# Patient Record
Sex: MALE | Race: WHITE
[De-identification: names, ages, dates, MRNs, and addresses within clinical notes are randomized per-mention and may not be internally consistent; named-entity substitution may affect disease eponyms.]

---
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
## 2022-05-22 ENCOUNTER — HOSPITAL ENCOUNTER (EMERGENCY)
Dept: HOSPITAL 47 - EC | Age: 22
LOS: 1 days | Discharge: HOME | End: 2022-05-23
Payer: COMMERCIAL

## 2022-05-22 DIAGNOSIS — F41.9: ICD-10-CM

## 2022-05-22 DIAGNOSIS — U07.1: Primary | ICD-10-CM

## 2022-05-22 DIAGNOSIS — F90.9: ICD-10-CM

## 2022-05-22 DIAGNOSIS — F17.200: ICD-10-CM

## 2022-05-22 DIAGNOSIS — F12.90: ICD-10-CM

## 2022-05-22 DIAGNOSIS — F32.A: ICD-10-CM

## 2022-05-22 LAB
ALBUMIN SERPL-MCNC: 4.8 G/DL (ref 3.5–5)
ALP SERPL-CCNC: 66 U/L (ref 38–126)
ALT SERPL-CCNC: 60 U/L (ref 4–49)
AMYLASE SERPL-CCNC: 61 U/L (ref 30–110)
ANION GAP SERPL CALC-SCNC: 9 MMOL/L
AST SERPL-CCNC: 42 U/L (ref 17–59)
BASOPHILS # BLD AUTO: 0 K/UL (ref 0–0.2)
BASOPHILS NFR BLD AUTO: 0 %
BUN SERPL-SCNC: 15 MG/DL (ref 9–20)
CALCIUM SPEC-MCNC: 9.2 MG/DL (ref 8.4–10.2)
CHLORIDE SERPL-SCNC: 104 MMOL/L (ref 98–107)
CO2 SERPL-SCNC: 26 MMOL/L (ref 22–30)
EOSINOPHIL # BLD AUTO: 0.1 K/UL (ref 0–0.7)
EOSINOPHIL NFR BLD AUTO: 1 %
ERYTHROCYTE [DISTWIDTH] IN BLOOD BY AUTOMATED COUNT: 5.14 M/UL (ref 4.3–5.9)
ERYTHROCYTE [DISTWIDTH] IN BLOOD: 13.1 % (ref 11.5–15.5)
GLUCOSE SERPL-MCNC: 100 MG/DL (ref 74–99)
HCT VFR BLD AUTO: 48.5 % (ref 39–53)
HGB BLD-MCNC: 16.2 GM/DL (ref 13–17.5)
LIPASE SERPL-CCNC: 37 U/L (ref 23–300)
LYMPHOCYTES # SPEC AUTO: 1 K/UL (ref 1–4.8)
LYMPHOCYTES NFR SPEC AUTO: 8 %
MCH RBC QN AUTO: 31.6 PG (ref 25–35)
MCHC RBC AUTO-ENTMCNC: 33.4 G/DL (ref 31–37)
MCV RBC AUTO: 94.5 FL (ref 80–100)
MONOCYTES # BLD AUTO: 0.5 K/UL (ref 0–1)
MONOCYTES NFR BLD AUTO: 4 %
NEUTROPHILS # BLD AUTO: 11.2 K/UL (ref 1.3–7.7)
NEUTROPHILS NFR BLD AUTO: 86 %
PLATELET # BLD AUTO: 238 K/UL (ref 150–450)
POTASSIUM SERPL-SCNC: 4.7 MMOL/L (ref 3.5–5.1)
PROT SERPL-MCNC: 8.2 G/DL (ref 6.3–8.2)
SODIUM SERPL-SCNC: 139 MMOL/L (ref 137–145)
WBC # BLD AUTO: 13 K/UL (ref 3.8–10.6)

## 2022-05-22 PROCEDURE — 99284 EMERGENCY DEPT VISIT MOD MDM: CPT

## 2022-05-22 PROCEDURE — 74018 RADEX ABDOMEN 1 VIEW: CPT

## 2022-05-22 PROCEDURE — 80053 COMPREHEN METABOLIC PANEL: CPT

## 2022-05-22 PROCEDURE — 83690 ASSAY OF LIPASE: CPT

## 2022-05-22 PROCEDURE — 36415 COLL VENOUS BLD VENIPUNCTURE: CPT

## 2022-05-22 PROCEDURE — 87502 INFLUENZA DNA AMP PROBE: CPT

## 2022-05-22 PROCEDURE — 82150 ASSAY OF AMYLASE: CPT

## 2022-05-22 PROCEDURE — 85025 COMPLETE CBC W/AUTO DIFF WBC: CPT

## 2022-05-22 PROCEDURE — 87635 SARS-COV-2 COVID-19 AMP PRB: CPT

## 2022-05-22 NOTE — XR
EXAMINATION TYPE: XR KUB

 

DATE OF EXAM: 5/22/2022

 

COMPARISON: NONE

 

HISTORY: Vomiting

 

TECHNIQUE: 2 views upright

 

FINDINGS: There is no sign of intestinal obstruction or pneumoperitoneum. Fecal pattern is normal. Th
ere are no calcifications over the kidneys. Lung bases are clear.

 

IMPRESSION: Nonacute abdomen.

## 2022-05-23 VITALS
DIASTOLIC BLOOD PRESSURE: 82 MMHG | SYSTOLIC BLOOD PRESSURE: 122 MMHG | RESPIRATION RATE: 20 BRPM | TEMPERATURE: 98.8 F | HEART RATE: 79 BPM

## 2022-05-23 NOTE — ED
Abdominal Pain HPI





- General


Chief Complaint: Abdominal Pain


Stated Complaint: Vomiting,Nausea


Time Seen by Provider: 05/23/22 00:04


Source: patient


Mode of arrival: ambulatory





- History of Present Illness


Initial Comments: 


Final is a 21-year-old male presents the emergency department today via 

ambulance for evaluation of epigastric discomfort nausea and vomiting.  Patient 

reports he's had nausea and vomiting throughout the day today.  Patient did test

positive or COVID 2 days ago but was not having any symptoms yesterday she did 

not believe it was related.  No suspicious food intake no diarrhea.  Patient 

received pain medication in route to the hospital he then had some vomiting upon

waiting to be evaluated.  He reports feeling much better after Zofran.








- Related Data


                                Home Medications











 Medication  Instructions  Recorded  Confirmed


 


Ibuprofen 800 mg PO DAILY PRN 04/25/18 04/25/18








                                  Previous Rx's











 Medication  Instructions  Recorded


 


HYDROcodone/APAP 5-325MG [Norco 1 tab PO Q6HR PRN #12 tab 04/26/18





5-325]  


 


Ibuprofen [Motrin] 600 mg PO Q8HR PRN #24 tab 04/26/18


 


Ondansetron Odt [Zofran Odt] 4 mg PO Q8HR PRN #10 tab 02/03/22











                                    Allergies











Allergy/AdvReac Type Severity Reaction Status Date / Time


 


No Known Allergies Allergy   Verified 05/22/22 22:41














Review of Systems


ROS Statement: 


Those systems with pertinent positive or pertinent negative responses have been 

documented in the HPI.





ROS Other: All systems not noted in ROS Statement are negative.





Past Medical History


Past Medical History: No Reported History


History of Any Multi-Drug Resistant Organisms: None Reported


Past Surgical History: No Surgical Hx Reported


Past Psychological History: ADD/ADHD, Anxiety, Depression


Smoking Status: Current every day smoker


Past Alcohol Use History: None Reported


Past Drug Use History: Marijuana





General Exam





- General Exam Comments


Initial Comments: 


Physical Exam


GENERAL:


Patient is well-developed and well-nourished.  


Patient is nontoxic and well-hydrated and is in no distress.





HENT:


Normocephalic, Atraumatic. 





EYES:


PERRL, EOMI





PULMONARY:


Unlabored respirations.  





CARDIOVASCULAR:


RRR


Warm and well perfused extremities





ABDOMEN:


Soft, Non-distended


Non peritoneal





SKIN:


No rashes or bruising 





: 


Deferred





NEUROLOGIC:


Alert and oriented


Normal speech


Normal gait





MUSCULOSKELETAL:


Moving all extremities with no apparent injury 





PSYCHIATRIC:


No SI/HI








Course





                                   Vital Signs











  05/22/22





  22:30


 


Temperature 97.9 F


 


Pulse Rate 63


 


Respiratory 19





Rate 


 


Blood Pressure 109/71


 


O2 Sat by Pulse 96





Oximetry 














Medical Decision Making





- Medical Decision Making


Patient labs and imaging were ordered from triage, patient's positive or COVID 

has mild leukocytosis other significant abnormalities


Patient's feeling better after Zofran no further vomiting tolerating oral intake

drinking water will be given a Zofran starter pack and prescribed Zofran for 

home.








- Lab Data


Result diagrams: 


                                 05/22/22 22:12





                                 05/22/22 22:12





                                   Lab Results











  05/22/22 05/22/22 05/22/22 Range/Units





  22:12 22:12 22:46 


 


WBC  13.0 H    (3.8-10.6)  k/uL


 


RBC  5.14    (4.30-5.90)  m/uL


 


Hgb  16.2    (13.0-17.5)  gm/dL


 


Hct  48.5    (39.0-53.0)  %


 


MCV  94.5    (80.0-100.0)  fL


 


MCH  31.6    (25.0-35.0)  pg


 


MCHC  33.4    (31.0-37.0)  g/dL


 


RDW  13.1    (11.5-15.5)  %


 


Plt Count  238    (150-450)  k/uL


 


MPV  8.8    


 


Neutrophils %  86    %


 


Lymphocytes %  8    %


 


Monocytes %  4    %


 


Eosinophils %  1    %


 


Basophils %  0    %


 


Neutrophils #  11.2 H    (1.3-7.7)  k/uL


 


Lymphocytes #  1.0    (1.0-4.8)  k/uL


 


Monocytes #  0.5    (0-1.0)  k/uL


 


Eosinophils #  0.1    (0-0.7)  k/uL


 


Basophils #  0.0    (0-0.2)  k/uL


 


Sodium   139   (137-145)  mmol/L


 


Potassium   4.7   (3.5-5.1)  mmol/L


 


Chloride   104   ()  mmol/L


 


Carbon Dioxide   26   (22-30)  mmol/L


 


Anion Gap   9   mmol/L


 


BUN   15   (9-20)  mg/dL


 


Creatinine   0.95   (0.66-1.25)  mg/dL


 


Est GFR (CKD-EPI)AfAm   >90   (>60 ml/min/1.73 sqM)  


 


Est GFR (CKD-EPI)NonAf   >90   (>60 ml/min/1.73 sqM)  


 


Glucose   100 H   (74-99)  mg/dL


 


Calcium   9.2   (8.4-10.2)  mg/dL


 


Total Bilirubin   1.3   (0.2-1.3)  mg/dL


 


AST   42   (17-59)  U/L


 


ALT   60 H   (4-49)  U/L


 


Alkaline Phosphatase   66   ()  U/L


 


Total Protein   8.2   (6.3-8.2)  g/dL


 


Albumin   4.8   (3.5-5.0)  g/dL


 


Amylase   61   ()  U/L


 


Lipase   37   ()  U/L


 


Coronavirus (PCR)     (Not Detectd)  


 


Influenza Type A RNA    Not Detected  (Not Detectd)  


 


Influenza Type B (PCR)    Not Detected  (Not Detectd)  














  05/22/22 Range/Units





  22:46 


 


WBC   (3.8-10.6)  k/uL


 


RBC   (4.30-5.90)  m/uL


 


Hgb   (13.0-17.5)  gm/dL


 


Hct   (39.0-53.0)  %


 


MCV   (80.0-100.0)  fL


 


MCH   (25.0-35.0)  pg


 


MCHC   (31.0-37.0)  g/dL


 


RDW   (11.5-15.5)  %


 


Plt Count   (150-450)  k/uL


 


MPV   


 


Neutrophils %   %


 


Lymphocytes %   %


 


Monocytes %   %


 


Eosinophils %   %


 


Basophils %   %


 


Neutrophils #   (1.3-7.7)  k/uL


 


Lymphocytes #   (1.0-4.8)  k/uL


 


Monocytes #   (0-1.0)  k/uL


 


Eosinophils #   (0-0.7)  k/uL


 


Basophils #   (0-0.2)  k/uL


 


Sodium   (137-145)  mmol/L


 


Potassium   (3.5-5.1)  mmol/L


 


Chloride   ()  mmol/L


 


Carbon Dioxide   (22-30)  mmol/L


 


Anion Gap   mmol/L


 


BUN   (9-20)  mg/dL


 


Creatinine   (0.66-1.25)  mg/dL


 


Est GFR (CKD-EPI)AfAm   (>60 ml/min/1.73 sqM)  


 


Est GFR (CKD-EPI)NonAf   (>60 ml/min/1.73 sqM)  


 


Glucose   (74-99)  mg/dL


 


Calcium   (8.4-10.2)  mg/dL


 


Total Bilirubin   (0.2-1.3)  mg/dL


 


AST   (17-59)  U/L


 


ALT   (4-49)  U/L


 


Alkaline Phosphatase   ()  U/L


 


Total Protein   (6.3-8.2)  g/dL


 


Albumin   (3.5-5.0)  g/dL


 


Amylase   ()  U/L


 


Lipase   ()  U/L


 


Coronavirus (PCR)  Detected A  (Not Detectd)  


 


Influenza Type A RNA   (Not Detectd)  


 


Influenza Type B (PCR)   (Not Detectd)  














Disposition


Clinical Impression: 


 COVID-19, Nausea and vomiting





Disposition: HOME SELF-CARE


Condition: Stable


Is patient prescribed a controlled substance at d/c from ED?: No


Referrals: 


None,Stated [Primary Care Provider] - 1-2 days

## 2022-08-05 ENCOUNTER — HOSPITAL ENCOUNTER (EMERGENCY)
Dept: HOSPITAL 47 - EC | Age: 22
Discharge: HOME | End: 2022-08-05
Payer: COMMERCIAL

## 2022-08-05 VITALS
TEMPERATURE: 98.1 F | HEART RATE: 95 BPM | DIASTOLIC BLOOD PRESSURE: 59 MMHG | SYSTOLIC BLOOD PRESSURE: 118 MMHG | RESPIRATION RATE: 16 BRPM

## 2022-08-05 DIAGNOSIS — F10.129: Primary | ICD-10-CM

## 2022-08-05 DIAGNOSIS — F39: ICD-10-CM

## 2022-08-05 DIAGNOSIS — F17.200: ICD-10-CM

## 2022-08-05 NOTE — ED
Alcohol HPI





- General


Chief Complaint: Alcohol


Stated Complaint: ETOH, Mental Health


Time Seen by Provider: 08/05/22 21:06


Source: patient, EMS


Mode of arrival: EMS


Limitations: no limitations





- History of Present Illness


Initial Comments: 





This patient is 21-year-old man who is complaining of depression and drinking.  

He does acknowledge that he some suicidal statements to his girlfriend earlier 

today.  He has had previous psychiatric care and states that tried him with 

antidepressants but they seem to make him feel worse.  He is not currently takin

g any medications.  Patient denies any injury or trauma related to drinking 

tonight.


MD Complaint: alcohol intoxication


Time Since Last Drink: 1


-: hour(s)


Associated Symptoms: depression, suicidality


Treatments Prior to Arrival: none


Chronic Alcohol Use: Yes





- Related Data


                                Home Medications











 Medication  Instructions  Recorded  Confirmed


 


Ibuprofen 800 mg PO DAILY PRN 04/25/18 04/25/18








                                  Previous Rx's











 Medication  Instructions  Recorded


 


HYDROcodone/APAP 5-325MG [Norco 1 tab PO Q6HR PRN #12 tab 04/26/18





5-325]  


 


Ibuprofen [Motrin] 600 mg PO Q8HR PRN #24 tab 04/26/18


 


Ondansetron Odt [Zofran Odt] 4 mg PO Q8HR PRN #10 tab 02/03/22


 


Ondansetron [Zofran ODT] 4 mg PO Q8HR #12 tab 05/23/22











                                    Allergies











Allergy/AdvReac Type Severity Reaction Status Date / Time


 


No Known Allergies Allergy   Verified 05/22/22 22:41














Review of Systems


ROS Statement: 


Those systems with pertinent positive or pertinent negative responses have been 

documented in the HPI.





ROS Other: All systems not noted in ROS Statement are negative.


Constitutional: Denies: fever, chills


Respiratory: Denies: cough, dyspnea


Cardiovascular: Denies: chest pain, palpitations


Gastrointestinal: Denies: abdominal pain, vomiting, diarrhea


Genitourinary: Denies: dysuria


Neurological: Denies: headache, weakness


Psychiatric: Reports: depression, suicidal thoughts.  Denies: auditory 

hallucinations, visual hallucinations, homicidal thoughts





Past Medical History


Past Medical History: No Reported History


History of Any Multi-Drug Resistant Organisms: None Reported


Past Surgical History: No Surgical Hx Reported


Past Psychological History: ADD/ADHD, Anxiety, Depression


Smoking Status: Current every day smoker


Past Alcohol Use History: None Reported


Past Drug Use History: Marijuana





General Exam


Limitations: no limitations


General appearance: alert, in no apparent distress


Head exam: Present: atraumatic, normocephalic


Eye exam: Present: normal appearance.  Absent: scleral icterus, conjunctival 

injection


Neck exam: Present: normal inspection, full ROM


Respiratory exam: Present: normal lung sounds bilaterally.  Absent: respiratory 

distress, wheezes, rales, rhonchi, stridor


Cardiovascular Exam: Present: regular rate, normal rhythm, normal heart sounds. 

Absent: systolic murmur, diastolic murmur, rubs, gallop


GI/Abdominal exam: Present: soft.  Absent: distended, tenderness, guarding, 

rebound, rigid, mass


Extremities exam: Present: normal inspection, normal capillary refill.  Absent: 

pedal edema, calf tenderness


Back exam: Present: normal inspection


Neurological exam: Present: alert, oriented X3


Psychiatric exam: Present: depressed, suicidal ideation.  Absent: agitated, 

anxious, flat affect, manic, homicidal ideation


Skin exam: Present: warm, dry, intact, normal color.  Absent: rash





Course


                                   Vital Signs











  08/05/22





  20:49


 


Temperature 98.1 F


 


Pulse Rate 95


 


Respiratory 16





Rate 


 


Blood Pressure 118/59


 


O2 Sat by Pulse 97





Oximetry 














Medical Decision Making





- Medical Decision Making





The patient is 21-year-old man here after he admits some suicidal statements 

earlier at home.  The patient requested to go home.  I did interview him and he 

states that he did make statements that were indicating suicidal ideation but 

that he had been drinking.  He states that he has a lot to live for and inclu

ding a young child at home.  He states that he will return should should he 

start feeling worse.  Patient advised not to drink any alcohol.





Disposition


Clinical Impression: 


 Alcoholic intoxication, Mood disorder





Disposition: HOME SELF-CARE


Condition: Good


Instructions (If sedation given, give patient instructions):  Alcohol 

Intoxication (ED)


Is patient prescribed a controlled substance at d/c from ED?: No


Referrals: 


None,Stated [Primary Care Provider] - 1-2 days

## 2022-09-09 ENCOUNTER — HOSPITAL ENCOUNTER (INPATIENT)
Dept: HOSPITAL 47 - EC | Age: 22
LOS: 4 days | Discharge: HOME | DRG: 881 | End: 2022-09-13
Attending: PSYCHIATRY & NEUROLOGY | Admitting: PSYCHIATRY & NEUROLOGY
Payer: COMMERCIAL

## 2022-09-09 DIAGNOSIS — Z20.822: ICD-10-CM

## 2022-09-09 DIAGNOSIS — F10.129: ICD-10-CM

## 2022-09-09 DIAGNOSIS — S10.91XA: ICD-10-CM

## 2022-09-09 DIAGNOSIS — F12.90: ICD-10-CM

## 2022-09-09 DIAGNOSIS — F41.9: ICD-10-CM

## 2022-09-09 DIAGNOSIS — F32.A: Primary | ICD-10-CM

## 2022-09-09 DIAGNOSIS — F17.200: ICD-10-CM

## 2022-09-09 DIAGNOSIS — T71.162A: ICD-10-CM

## 2022-09-09 LAB
ALBUMIN SERPL-MCNC: 4.8 G/DL (ref 3.5–5)
ALP SERPL-CCNC: 70 U/L (ref 38–126)
ALT SERPL-CCNC: 51 U/L (ref 4–49)
ANION GAP SERPL CALC-SCNC: 15 MMOL/L
APTT BLD: 26.5 SEC (ref 22–30)
AST SERPL-CCNC: 37 U/L (ref 17–59)
BASOPHILS # BLD AUTO: 0.1 K/UL (ref 0–0.2)
BASOPHILS NFR BLD AUTO: 1 %
BUN SERPL-SCNC: 11 MG/DL (ref 9–20)
CALCIUM SPEC-MCNC: 9.9 MG/DL (ref 8.4–10.2)
CHLORIDE SERPL-SCNC: 96 MMOL/L (ref 98–107)
CO2 SERPL-SCNC: 31 MMOL/L (ref 22–30)
EOSINOPHIL # BLD AUTO: 0.1 K/UL (ref 0–0.7)
EOSINOPHIL NFR BLD AUTO: 1 %
ERYTHROCYTE [DISTWIDTH] IN BLOOD BY AUTOMATED COUNT: 5.17 M/UL (ref 4.3–5.9)
ERYTHROCYTE [DISTWIDTH] IN BLOOD: 13.4 % (ref 11.5–15.5)
GLUCOSE BLD-MCNC: 88 MG/DL (ref 70–110)
GLUCOSE SERPL-MCNC: 87 MG/DL (ref 74–99)
HCT VFR BLD AUTO: 49.9 % (ref 39–53)
HGB BLD-MCNC: 16.1 GM/DL (ref 13–17.5)
INR PPP: 1 (ref ?–1.2)
LYMPHOCYTES # SPEC AUTO: 1.8 K/UL (ref 1–4.8)
LYMPHOCYTES NFR SPEC AUTO: 21 %
MCH RBC QN AUTO: 31.2 PG (ref 25–35)
MCHC RBC AUTO-ENTMCNC: 32.4 G/DL (ref 31–37)
MCV RBC AUTO: 96.4 FL (ref 80–100)
MONOCYTES # BLD AUTO: 0.5 K/UL (ref 0–1)
MONOCYTES NFR BLD AUTO: 6 %
NEUTROPHILS # BLD AUTO: 6 K/UL (ref 1.3–7.7)
NEUTROPHILS NFR BLD AUTO: 71 %
PLATELET # BLD AUTO: 187 K/UL (ref 150–450)
POTASSIUM SERPL-SCNC: 4.7 MMOL/L (ref 3.5–5.1)
PROT SERPL-MCNC: 7.9 G/DL (ref 6.3–8.2)
PT BLD: 10.9 SEC (ref 9–12)
SODIUM SERPL-SCNC: 142 MMOL/L (ref 137–145)
WBC # BLD AUTO: 8.5 K/UL (ref 3.8–10.6)

## 2022-09-09 PROCEDURE — 72125 CT NECK SPINE W/O DYE: CPT

## 2022-09-09 PROCEDURE — 99285 EMERGENCY DEPT VISIT HI MDM: CPT

## 2022-09-09 PROCEDURE — 85730 THROMBOPLASTIN TIME PARTIAL: CPT

## 2022-09-09 PROCEDURE — 83605 ASSAY OF LACTIC ACID: CPT

## 2022-09-09 PROCEDURE — 80053 COMPREHEN METABOLIC PANEL: CPT

## 2022-09-09 PROCEDURE — 99291 CRITICAL CARE FIRST HOUR: CPT

## 2022-09-09 PROCEDURE — 80306 DRUG TEST PRSMV INSTRMNT: CPT

## 2022-09-09 PROCEDURE — 80320 DRUG SCREEN QUANTALCOHOLS: CPT

## 2022-09-09 PROCEDURE — 85025 COMPLETE CBC W/AUTO DIFF WBC: CPT

## 2022-09-09 PROCEDURE — 70450 CT HEAD/BRAIN W/O DYE: CPT

## 2022-09-09 PROCEDURE — 36415 COLL VENOUS BLD VENIPUNCTURE: CPT

## 2022-09-09 PROCEDURE — 84443 ASSAY THYROID STIM HORMONE: CPT

## 2022-09-09 PROCEDURE — 87635 SARS-COV-2 COVID-19 AMP PRB: CPT

## 2022-09-09 PROCEDURE — 85610 PROTHROMBIN TIME: CPT

## 2022-09-09 PROCEDURE — 84484 ASSAY OF TROPONIN QUANT: CPT

## 2022-09-09 PROCEDURE — 71045 X-RAY EXAM CHEST 1 VIEW: CPT

## 2022-09-09 NOTE — CT
EXAMINATION TYPE: CT brain cspine wo con

CT DLP: 1763.9 mGycm, Automated exposure control for dose reduction was used.

 

DATE OF EXAM: 9/9/2022 6:39 PM

 

COMPARISON: THIS EXAM WAS READ DURING PACS DOWNTIME, NO PRIORS AVAILABLE.. 

 

CLINICAL INDICATION:Male, 21 years old with history of trauma; trauma, attempted suicide

 

TECHNIQUE: 

Brain: Multiple axial CT images of the brain were obtained without IV contrast. 

Cspine: Axial CT images from the skull base to the inferior aspect of T2 we obtained without intraven
ous contrast. Coronal and sagittal reformatted images were also reviewed. 

 

FINDINGS:

 

Brain:

Extra-axial spaces: No abnormal extra-axial fluid collections.

Ventricular system: Within normal limits

Cerebral parenchyma: No acute intraparenchymal hemorrhage or mass effect.  The gray-white junction is
 well differentiated. 

Cerebellum: Unremarkable.

Mass effect: No evidence of midline shift.

Intracranial vasculature: unremarkable

Soft tissues: Normal.

Calvarium/osseous structures: No depressed skull fracture.

Paranasal sinuses and mastoid air cells: Clear.

Visualized orbits: Orbital contents are intact.

 

Cervical spine:

Fracture: None.

Osseous structures: Unremarkable 

Vertebral alignment: Within normal limits.

Spinal canal/Neural Foramina: No evidence of significant spinal canal narrowing. No evidence for sign
ificant neural foraminal stenosis.

Neck soft tissues: Prevertebral soft tissues are within normal limits.

Other: The airway is patent. The lung apices are clear.

 

IMPRESSION:

1.  No acute intracranial process.

2.  No evidence of cervical spine fracture.

## 2022-09-09 NOTE — ED
Trauma HPI





- General


Stated Complaint: Trauma


Time Seen by Provider: 09/09/22 18:08


Source: patient, RN notes reviewed





- History of Present Illness


Initial Comments: 





21-year-old male history of depression who apparently try to hang himself with a

rope dog leash prior to arrival he states he was about half a foot off the 

ground rope broke however before he passed out he did hit his head when he fell 

he denies a loss of function is upper or lower extremities he does admit to 

drinking alcohol today.  He was found by paramedics to have some abrasion to the

anterior neck consistent with the reports of trying to hang himself.  Patient 

denies any shortness of breath difficulty with swallowing or speech at this 

time.  No deficits to his upper or lower extremities.  He did come in with a 

cervical collar on.


MD Complaint: injury





- Related Data


                                Home Medications











 Medication  Instructions  Recorded  Confirmed


 


No Known Home Medications  09/09/22 09/09/22











                                    Allergies











Allergy/AdvReac Type Severity Reaction Status Date / Time


 


No Known Allergies Allergy   Verified 09/09/22 19:52














Review of Systems


ROS Statement: 


Those systems with pertinent positive or pertinent negative responses have been 

documented in the HPI.





ROS Other: All systems not noted in ROS Statement are negative.





Past Medical History


Past Medical History: No Reported History


History of Any Multi-Drug Resistant Organisms: None Reported


Past Surgical History: No Surgical Hx Reported


Past Psychological History: ADD/ADHD, Anxiety, Depression


Smoking Status: Current every day smoker


Past Alcohol Use History: None Reported


Past Drug Use History: Marijuana





General Exam





- General Exam Comments


Initial Comments: 





This is a well-developed well-nourished awake alert oriented 4 male


General appearance: alert, anxious


Head exam: Present: atraumatic, normocephalic, normal inspection


Eye exam: Present: normal appearance, PERRL, EOMI.  Absent: scleral icterus, 

conjunctival injection, periorbital swelling


ENT exam: Present: normal exam, normal oropharynx, mucous membranes moist


Neck exam: Present: normal inspection, other (Cervical collar in place 

superficial abrasion seen over the anterior upper neck no localized edema no 

stridor JVD or bruits).  Absent: tenderness, meningismus, lymphadenopathy


Respiratory exam: Present: normal lung sounds bilaterally.  Absent: respiratory 

distress, wheezes, rales, rhonchi, stridor


Cardiovascular Exam: Present: regular rate, normal rhythm, normal heart sounds. 

Absent: systolic murmur, diastolic murmur, rubs, gallop, clicks


GI/Abdominal exam: Present: soft, normal bowel sounds.  Absent: distended, 

tenderness, guarding, rebound, rigid


Extremities exam: Present: normal inspection, full ROM, normal capillary refill.

 Absent: tenderness, pedal edema, joint swelling, calf tenderness


Back exam: Present: normal inspection


Neurological exam: Present: alert, oriented X3, CN II-XII intact


Psychiatric exam: Present: normal affect, normal mood


Skin exam: Present: warm, dry, normal color, other (As noted above).  Absent: 

rash





Course


                                   Vital Signs











  09/09/22





  18:41


 


Temperature 98 F


 


Pulse Rate 76


 


Respiratory 16





Rate 


 


Blood Pressure 122/63


 


O2 Sat by Pulse 97





Oximetry 














Medical Decision Making





- Medical Decision Making





Patient was activated priority 2 trauma did discuss case with Dr. Cannon.  

Patient is now medically cleared for psychiatric evaluation patient was eval

uated by the EPS service she will be admitted inpatient for further evaluation 

treatment for suicidal thoughts and depression.





- Lab Data


Result diagrams: 


                                 09/09/22 18:31





                                 09/09/22 18:31


                                   Lab Results











  09/09/22 09/09/22 09/09/22 Range/Units





  18:20 18:31 18:31 


 


WBC   8.5   (3.8-10.6)  k/uL


 


RBC   5.17   (4.30-5.90)  m/uL


 


Hgb   16.1   (13.0-17.5)  gm/dL


 


Hct   49.9   (39.0-53.0)  %


 


MCV   96.4   (80.0-100.0)  fL


 


MCH   31.2   (25.0-35.0)  pg


 


MCHC   32.4   (31.0-37.0)  g/dL


 


RDW   13.4   (11.5-15.5)  %


 


Plt Count   187   (150-450)  k/uL


 


MPV   8.7   


 


Neutrophils %   71   %


 


Lymphocytes %   21   %


 


Monocytes %   6   %


 


Eosinophils %   1   %


 


Basophils %   1   %


 


Neutrophils #   6.0   (1.3-7.7)  k/uL


 


Lymphocytes #   1.8   (1.0-4.8)  k/uL


 


Monocytes #   0.5   (0-1.0)  k/uL


 


Eosinophils #   0.1   (0-0.7)  k/uL


 


Basophils #   0.1   (0-0.2)  k/uL


 


PT    10.9  (9.0-12.0)  sec


 


INR    1.0  (<1.2)  


 


APTT    26.5  (22.0-30.0)  sec


 


Sodium     (137-145)  mmol/L


 


Potassium     (3.5-5.1)  mmol/L


 


Chloride     ()  mmol/L


 


Carbon Dioxide     (22-30)  mmol/L


 


Anion Gap     mmol/L


 


BUN     (9-20)  mg/dL


 


Creatinine     (0.66-1.25)  mg/dL


 


Est GFR (CKD-EPI)AfAm     (>60 ml/min/1.73 sqM)  


 


Est GFR (CKD-EPI)NonAf     (>60 ml/min/1.73 sqM)  


 


Glucose     (74-99)  mg/dL


 


POC Glucose (mg/dL)  88    ()  mg/dL


 


POC Glu Operater ID  Jian Zavala    


 


Plasma Lactic Acid Woo     (0.7-2.0)  mmol/L


 


Calcium     (8.4-10.2)  mg/dL


 


Total Bilirubin     (0.2-1.3)  mg/dL


 


AST     (17-59)  U/L


 


ALT     (4-49)  U/L


 


Alkaline Phosphatase     ()  U/L


 


Troponin I     (0.000-0.034)  ng/mL


 


Total Protein     (6.3-8.2)  g/dL


 


Albumin     (3.5-5.0)  g/dL


 


Serum Alcohol     mg/dL














  09/09/22 09/09/22 09/09/22 Range/Units





  18:31 18:31 18:31 


 


WBC     (3.8-10.6)  k/uL


 


RBC     (4.30-5.90)  m/uL


 


Hgb     (13.0-17.5)  gm/dL


 


Hct     (39.0-53.0)  %


 


MCV     (80.0-100.0)  fL


 


MCH     (25.0-35.0)  pg


 


MCHC     (31.0-37.0)  g/dL


 


RDW     (11.5-15.5)  %


 


Plt Count     (150-450)  k/uL


 


MPV     


 


Neutrophils %     %


 


Lymphocytes %     %


 


Monocytes %     %


 


Eosinophils %     %


 


Basophils %     %


 


Neutrophils #     (1.3-7.7)  k/uL


 


Lymphocytes #     (1.0-4.8)  k/uL


 


Monocytes #     (0-1.0)  k/uL


 


Eosinophils #     (0-0.7)  k/uL


 


Basophils #     (0-0.2)  k/uL


 


PT     (9.0-12.0)  sec


 


INR     (<1.2)  


 


APTT     (22.0-30.0)  sec


 


Sodium  142    (137-145)  mmol/L


 


Potassium  4.7    (3.5-5.1)  mmol/L


 


Chloride  96 L    ()  mmol/L


 


Carbon Dioxide  31 H    (22-30)  mmol/L


 


Anion Gap  15    mmol/L


 


BUN  11    (9-20)  mg/dL


 


Creatinine  0.87    (0.66-1.25)  mg/dL


 


Est GFR (CKD-EPI)AfAm  >90    (>60 ml/min/1.73 sqM)  


 


Est GFR (CKD-EPI)NonAf  >90    (>60 ml/min/1.73 sqM)  


 


Glucose  87    (74-99)  mg/dL


 


POC Glucose (mg/dL)     ()  mg/dL


 


POC Glu Operater ID     


 


Plasma Lactic Acid Woo   3.0 H*   (0.7-2.0)  mmol/L


 


Calcium  9.9    (8.4-10.2)  mg/dL


 


Total Bilirubin  0.9    (0.2-1.3)  mg/dL


 


AST  37    (17-59)  U/L


 


ALT  51 H    (4-49)  U/L


 


Alkaline Phosphatase  70    ()  U/L


 


Troponin I    <0.012  (0.000-0.034)  ng/mL


 


Total Protein  7.9    (6.3-8.2)  g/dL


 


Albumin  4.8    (3.5-5.0)  g/dL


 


Serum Alcohol  <10    mg/dL














- EKG Data


-: EKG Interpreted by Me


EKG shows normal: sinus rhythm


EKG Comments: 





Sinus rhythm a 77.  Interval 150 QRS duration 100 QT since /400 

nonspecific T-wave configuration





- Radiology Data


Radiology results: report reviewed (Image reviewed as well as report no acute 

findings.), image reviewed





Critical Care Time


Critical Care Time: Yes


Total Critical Care Time: 39


Critical Care Time: 





Critical care time including initial presentation with history physical 

discussed with paramedics labs x-rays reevaluation patient on several occasions 

discussion with family.  Discussed with the EPS service documentation the above 

review of old charting was available.





Disposition


Clinical Impression: 


 Depression, Attempted suicide, Hanging, Neck abrasion





Disposition: TRANSFER TO PSYCH HOSP/UNIT


Condition: Stable


Referrals: 


None,Stated [Primary Care Provider] - 1-2 days


Decision Date: 09/09/22


Decision Time: 20:55

## 2022-09-09 NOTE — XR
EXAMINATION TYPE: XR chest 1V portable

 

DATE OF EXAM: 9/9/2022 6:35 PM

 

COMPARISON: THIS EXAM WAS READ DURING PACS DOWNTIME, NO PRIORS AVAILABLE.

 

TECHNIQUE: XR chest 1V portable Portable AP radiograph of the chest.

 

CLINICAL INDICATION:Male, 21 years old with history of trauma; 

 

FINDINGS: 

Lungs/Pleura: There is no evidence of pleural effusion, focal consolidation, or pneumothorax.  

Pulmonary vascularity: Unremarkable.

Heart/mediastinum: Cardiomediastinal silhouette is unremarkable.

Musculoskeletal: No acute osseous pathology.

 

 

IMPRESSION: 

No acute cardiopulmonary disease/process.

## 2022-09-10 RX ADMIN — NICOTINE POLACRILEX PRN MG: 2 GUM, CHEWING BUCCAL at 20:41

## 2022-09-10 RX ADMIN — NICOTINE POLACRILEX PRN MG: 2 GUM, CHEWING BUCCAL at 18:23

## 2022-09-10 NOTE — P.CONS
History of Present Illness





- Reason for Consult


Consult date: 09/10/22





- History of Present Illness





The patient is a 21-year-old male with no known PMH who was brought into the 

emergency room by EMS after he was found attempting to hang himself.  The 

patient was admitted to the mental health unit where he was seen and evaluated. 

The patient was reluctant to talk about the episode but stated that he had been 

drinking, which led to his erratic behavior.  He denied any suicidal ideation.  

He denied any chronic medical illnesses.  Reports only occasional alcohol use 

which she is trying to quit.  Denied history of alcohol withdrawal.  Denied 

experiencing chest discomfort, shortness of breath, fever, chills, cough, 

nausea, vomiting, abdominal pain, diarrhea.  The patient's laboratory outpatient

in the emergency room was remarkable for lactic acidosis of 3.0 with urine tox 

positive for marijuana.





Review of systems:


Pertinent positives and negatives as discussed in HPI, a complete review of 

systems was performed and all other systems are negative.





Physical examination:


General: non toxic, no distress, appears at stated age, normal weight


Derm: no unusual rashes/lesions, no unusual ecchymoses, warm, dry


Head: atraumatic, normocephalic, symmetric


Eyes: EOMI, no lid lag, anicteric sclera


ENT: Nose and ears atraumatic, no thrush,  no pharyngeal erythema


Neck: trachea midline, supple


Mouth: no lip lesion, mucus membranes moist


Cardiovascular: S1S2 reg, no murmur, no edema


Lungs: CTA bilateral, no rhonchi, no rales , no accessory muscle use


Abdominal: soft,  nontender to palpation, no guarding


Ext: no gross muscle atrophy, no contractures, 


Neuro: No gross focal neuro deficits noted 


Psych: Alert, oriented, appropriate affect 





Assessment/plan





Marijuana and alcohol abuse


-Advised on importance of cessation





Depression with suicidal ideation


-As per psychiatry





Thank you for allowing us to participate in the care of this patient.  We will 

follow peripherally.  Do not hesitate to contact us with questions.  Someone can

be reached from the [facility] hospitalist group at all hours of the day 

at [phone].





Past Medical History


Past Medical History: No Reported History


History of Any Multi-Drug Resistant Organisms: None Reported


Past Surgical History: No Surgical Hx Reported


Past Psychological History: ADD/ADHD, Anxiety, Depression


Smoking Status: Current every day smoker


Past Alcohol Use History: None Reported


Past Drug Use History: Marijuana





- Past Family History


  ** Mother


Family Medical History: Hypertension





Medications and Allergies


                                Home Medications











 Medication  Instructions  Recorded  Confirmed  Type


 


No Known Home Medications  09/09/22 09/09/22 History








                                    Allergies











Allergy/AdvReac Type Severity Reaction Status Date / Time


 


No Known Allergies Allergy   Verified 09/09/22 19:52














Physical Exam


Vitals: 


                                   Vital Signs











  Temp Pulse Resp BP


 


 09/10/22 00:33  98.0 F  93  16  136/73














Results


CBC & Chem 7: 


                                 09/09/22 18:31





                                 09/09/22 18:31


Labs: 


                  Abnormal Lab Results - Last 24 Hours (Table)











  09/09/22 Range/Units





  21:32 


 


U Marijuana (THC) Screen  Detected H  (NotDetected)
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
## 2022-09-11 LAB
ALBUMIN SERPL-MCNC: 5 G/DL (ref 3.5–5)
ALP SERPL-CCNC: 88 U/L (ref 38–126)
ALT SERPL-CCNC: 62 U/L (ref 4–49)
ANION GAP SERPL CALC-SCNC: 14 MMOL/L
AST SERPL-CCNC: 40 U/L (ref 17–59)
BASOPHILS # BLD AUTO: 0 K/UL (ref 0–0.2)
BASOPHILS NFR BLD AUTO: 0 %
BUN SERPL-SCNC: 14 MG/DL (ref 9–20)
CALCIUM SPEC-MCNC: 10.2 MG/DL (ref 8.4–10.2)
CHLORIDE SERPL-SCNC: 97 MMOL/L (ref 98–107)
CO2 SERPL-SCNC: 28 MMOL/L (ref 22–30)
EOSINOPHIL # BLD AUTO: 0 K/UL (ref 0–0.7)
EOSINOPHIL NFR BLD AUTO: 0 %
ERYTHROCYTE [DISTWIDTH] IN BLOOD BY AUTOMATED COUNT: 5.26 M/UL (ref 4.3–5.9)
ERYTHROCYTE [DISTWIDTH] IN BLOOD: 13.6 % (ref 11.5–15.5)
GLUCOSE SERPL-MCNC: 101 MG/DL (ref 74–99)
HCT VFR BLD AUTO: 51.7 % (ref 39–53)
HGB BLD-MCNC: 17.2 GM/DL (ref 13–17.5)
LYMPHOCYTES # SPEC AUTO: 1.4 K/UL (ref 1–4.8)
LYMPHOCYTES NFR SPEC AUTO: 19 %
MCH RBC QN AUTO: 32.7 PG (ref 25–35)
MCHC RBC AUTO-ENTMCNC: 33.3 G/DL (ref 31–37)
MCV RBC AUTO: 98.2 FL (ref 80–100)
MONOCYTES # BLD AUTO: 0.4 K/UL (ref 0–1)
MONOCYTES NFR BLD AUTO: 6 %
NEUTROPHILS # BLD AUTO: 5.5 K/UL (ref 1.3–7.7)
NEUTROPHILS NFR BLD AUTO: 72 %
PLATELET # BLD AUTO: 206 K/UL (ref 150–450)
POTASSIUM SERPL-SCNC: 5.2 MMOL/L (ref 3.5–5.1)
PROT SERPL-MCNC: 8.5 G/DL (ref 6.3–8.2)
SODIUM SERPL-SCNC: 139 MMOL/L (ref 137–145)
WBC # BLD AUTO: 7.6 K/UL (ref 3.8–10.6)

## 2022-09-11 RX ADMIN — NICOTINE POLACRILEX PRN MG: 2 GUM, CHEWING BUCCAL at 20:37

## 2022-09-11 RX ADMIN — NICOTINE POLACRILEX PRN MG: 2 GUM, CHEWING BUCCAL at 11:00

## 2022-09-11 RX ADMIN — NICOTINE POLACRILEX PRN MG: 2 GUM, CHEWING BUCCAL at 16:46

## 2022-09-11 NOTE — P.PN
Progress Note - Text


Progress Note Date: 09/11/22


Interval history: 


Patient was seen attending group and was directable and agreeable to speak with 

writer. He reports improved mood, and reports he slept well, but overall appears

anxious on assessment. At this time, patient denies any suicidal or homicidal 

ideation, intent or plan. Denies any auditory or visual hallucinations. Patient 

denies any side effects from the medications and has been compliant with meds. 





Mental status exam: 


General Appearance: Patient appears to be stated age, has tattoos, improved 

hygiene. 


Behavior: Patient appears anxious without any agitated behavior. 


Speech: Patient's speech is fluent and non-pressured.


Mood/Affect: Patient reports their mood is better, affect appears anxious and 

constricted. 


Suicidality/Homicidality: Patient denies having any homicidal ideation intent or

plan. Denies any suicidal ideation, intent or plan.  


Perceptions: Patient denies any visual hallucinations and denies any auditory 

hallucinations.


Though content/process: There is no evidence of any delusional thought content 

and thought process is linear and goal-directed.


Memory and concentration: AOX3, grossly intact for the purposes of this session


Judgment and insight: improving mildly





Assessment/Plan: 


Continue with current diagnosis. 


Patient continues to meet criteria for inpatient psychiatric admission for 

symptom stabilization and safety.


Will increase his Prozac to 20 mg daily for anxiety.


Monitor for medication compliance and for any psychotropic medication side 

effects. 


Will continue to monitor ongoing response to treatment.  


Encouraged participation in milieu.


He would like to attend outpatient substance abuse treatment after discharge and

declines residential substance abuse treatment.

## 2022-09-12 RX ADMIN — NICOTINE POLACRILEX PRN MG: 2 GUM, CHEWING BUCCAL at 12:45

## 2022-09-12 RX ADMIN — NICOTINE POLACRILEX PRN MG: 2 GUM, CHEWING BUCCAL at 20:07

## 2022-09-12 RX ADMIN — NICOTINE POLACRILEX PRN MG: 2 GUM, CHEWING BUCCAL at 17:42

## 2022-09-13 VITALS
DIASTOLIC BLOOD PRESSURE: 65 MMHG | TEMPERATURE: 98 F | RESPIRATION RATE: 16 BRPM | SYSTOLIC BLOOD PRESSURE: 109 MMHG | HEART RATE: 60 BPM

## 2022-09-13 RX ADMIN — NICOTINE POLACRILEX PRN MG: 2 GUM, CHEWING BUCCAL at 09:23

## 2022-09-13 NOTE — P.PN
Progress Note - Text


Progress Note Date: 09/12/22


S&O: Patient was seen in rounds.  He was admitted on 09/09/2022 when he got 

intoxicated and tried to hang himself.  Computed tomography scan of the neck and

head where within normal limits.  He said he does not drink on a regular basis 

but sometimes he goes on heavy binging.  Said he was drinking since about 10:00 

in the morning hard liquor along with high alcohol containing beer.  He insists 

that that he did not have any other issues to make him to kill himself.  He said

he has a 5-month-old daughter and a girlfriend who he loves too much to kill 

himself.  He said he smokes pot here and there but again he said he doesn't do 

it on a regular basis.  He denies any ongoing mental health issues.  His 

psychiatric H&P shows the diagnosis of unspecified depressive disorder 

unspecified anxiety disorder alcohol and cannabis use disorder mild to moderate.

 He was started on Prozac 20 mg a day and says he feels better after 2 days of 

its use.  He was counseled about the ineffectiveness of Prozac in 2 days and was

suggested that he feels better because alcohol had left his system, he is happy 

that he did not die and is able to appreciate that he has a loving girlfriend 

and a daughter which probably made him feel better since Prozac doesn't work 

that quickly.  He agreed.  He said he will stop drinking and smoking pot, and 

tried to stay clean get a job and be with his family.





This is a right ambulatory male with good hygiene.  He has multiple tattoos.  He

is polite friendly cheerful and cooperative.  He does not have any psychomotor 

agitation or retardation.  His speech is spontaneous relevant and goal-directed.

 His mood is cheerful and affect is appropriate.  He denies any hallucinations 

delusional thinking suicide and homicide thoughts.  His sensorium is clear.





A&P: Appears to have had acute alcohol intoxication delirium resulting in 

attempting to hang himself.  Does not have enough information to make the 

diagnosis of anxiety disorder or depressive disorder.  He agreed to stop Prozac 

and consider for discharge tomorrow.

## 2022-09-13 NOTE — P.DS
Providers


Date of admission: 


09/10/22 00:01





Expected date of discharge: 09/13/22


Attending physician: 


Jose العلي MD





Consults: 





                                        





09/10/22 00:05


Consult Physician Routine 


   Consulting Provider: [facility]


   Consult Reason/Comments: h&p and medical follow up


   Do you want consulting provider notified?: Yes











Primary care physician: 


Stated None





Hospital Course: 


Patient had his psychiatric H&P done by Dr. Mancia on 09/10/2022 and physical 

examination done by Dr. Jennings on the same day.  He had a computed tomography scan

of the head didn't neck to evaluate possible injury after he had tried to hang 

himself.  It did not show any injury or abnormality.  Patient was started on 

Prozac 10 mg a day which was increased to 20 mg a day by Dr. Mancia for possible 

anxiety.  When I saw him on 09/12/2022 he did not provide any information to 

make any diagnosis other alcohol intoxication and alcohol use disorder.  In view

of this his Prozac was discontinued.  Patient continued to do well without any 

medication and did not have any behavioral problems.  He had insisted that he 

was not suicidal and did what he did only when he was intoxicated on alcohol.  

His condition was discussed with the treatment team this morning and it was 

agreed to discharge him with outpatient follow-up with the therapist/counselor 

regarding alcohol use and learning better coping skills.  He was advised about 

this and he agreed with this plan.





Plan - Discharge Summary


Discharge Rx Participant: No


New Discharge Prescriptions: 


No Action


   No Known Home Medications 


Discharge Medication List





No Known Home Medications  09/09/22 [History]








Activity/Diet/Wound Care/Special Instructions: 


Avoid the use of street drugs and alcohol.  Take all prescriptions as 

prescribed.  When you are in need of refills on your medications, please contact

 your medical provider and/or outpatient psychiatrist to have this done.  Please

 go to scheduled outpatient appointment for aftercare treatment.  If symptoms 

return or become worse, call the crisis line at [phone] and/or go to the 

nearest emergency room for evaluation. 


Discharge Disposition: HOME SELF-CARE


Plan of Treatment: 


Patient will be referred to see a alcohol counselor/therapist regarding his 

alcohol use and to learn better coping skills.